# Patient Record
Sex: MALE | Race: WHITE | NOT HISPANIC OR LATINO | Employment: FULL TIME | URBAN - METROPOLITAN AREA
[De-identification: names, ages, dates, MRNs, and addresses within clinical notes are randomized per-mention and may not be internally consistent; named-entity substitution may affect disease eponyms.]

---

## 2023-08-22 ENCOUNTER — TRANSFERRED RECORDS (OUTPATIENT)
Dept: HEALTH INFORMATION MANAGEMENT | Facility: CLINIC | Age: 58
End: 2023-08-22

## 2023-08-22 LAB
ALT SERPL-CCNC: 23 U/L
AST SERPL-CCNC: 26 U/L (ref 17–59)
CHOLESTEROL (EXTERNAL): 219 MG/DL (ref 0–200)
CREATININE (EXTERNAL): 0.9 MG/DL (ref 0.7–1.4)
GFR ESTIMATED (EXTERNAL): >60 ML/MIN/1.73M2
GLUCOSE (EXTERNAL): 88 MG/DL (ref 60–99)
HBA1C MFR BLD: 5.39 % (ref 4–5.6)
HDLC SERPL-MCNC: 44 MG/DL (ref 35–65)
LDL CHOLESTEROL CALCULATED (EXTERNAL): 135 MG/DL (ref 0–130)
POTASSIUM (EXTERNAL): 4.4 MMOL/L (ref 3.5–5.1)
TRIGLYCERIDES (EXTERNAL): 199 MG/DL (ref 0–200)
TSH SERPL-ACNC: 1.98 MIU/L (ref 0.47–4.68)

## 2023-09-12 ENCOUNTER — HOSPITAL ENCOUNTER (OUTPATIENT)
Dept: CARDIOLOGY | Facility: CLINIC | Age: 58
Discharge: HOME | End: 2023-09-12
Attending: FAMILY MEDICINE | Admitting: FAMILY MEDICINE
Payer: COMMERCIAL

## 2023-09-12 DIAGNOSIS — R01.1 HEART MURMUR: ICD-10-CM

## 2023-09-12 LAB — BI-PLANE LVEF ECHO: NORMAL

## 2023-09-12 PROCEDURE — 93306 TTE W/DOPPLER COMPLETE: CPT | Mod: 26 | Performed by: GENERAL ACUTE CARE HOSPITAL

## 2023-09-12 PROCEDURE — 93306 TTE W/DOPPLER COMPLETE: CPT

## 2023-09-21 ENCOUNTER — MEDICAL CORRESPONDENCE (OUTPATIENT)
Dept: HEALTH INFORMATION MANAGEMENT | Facility: CLINIC | Age: 58
End: 2023-09-21
Payer: COMMERCIAL

## 2023-09-25 ENCOUNTER — TRANSFERRED RECORDS (OUTPATIENT)
Dept: HEALTH INFORMATION MANAGEMENT | Facility: CLINIC | Age: 58
End: 2023-09-25

## 2023-09-27 ENCOUNTER — OFFICE VISIT (OUTPATIENT)
Dept: CARDIOLOGY | Facility: CLINIC | Age: 58
End: 2023-09-27
Payer: COMMERCIAL

## 2023-09-27 VITALS
HEART RATE: 68 BPM | WEIGHT: 211 LBS | RESPIRATION RATE: 16 BRPM | HEIGHT: 69 IN | BODY MASS INDEX: 31.25 KG/M2 | DIASTOLIC BLOOD PRESSURE: 78 MMHG | SYSTOLIC BLOOD PRESSURE: 142 MMHG

## 2023-09-27 DIAGNOSIS — I50.30 HEART FAILURE WITH PRESERVED EJECTION FRACTION, NYHA CLASS I (H): ICD-10-CM

## 2023-09-27 DIAGNOSIS — I10 ESSENTIAL HYPERTENSION: ICD-10-CM

## 2023-09-27 DIAGNOSIS — I77.810 ASCENDING AORTA DILATATION (H): ICD-10-CM

## 2023-09-27 DIAGNOSIS — I35.1 NONRHEUMATIC AORTIC VALVE INSUFFICIENCY: Primary | ICD-10-CM

## 2023-09-27 PROCEDURE — 99204 OFFICE O/P NEW MOD 45 MIN: CPT | Performed by: INTERNAL MEDICINE

## 2023-09-27 RX ORDER — PANTOPRAZOLE SODIUM 40 MG/1
40 TABLET, DELAYED RELEASE ORAL DAILY
COMMUNITY
Start: 2023-03-01

## 2023-09-27 RX ORDER — DIPHENHYDRAMINE HCL 25 MG
25 TABLET ORAL AT BEDTIME
COMMUNITY

## 2023-09-27 RX ORDER — MULTIVITAMIN,THERAPEUTIC
1 TABLET ORAL DAILY
COMMUNITY

## 2023-09-27 RX ORDER — LOSARTAN POTASSIUM 25 MG/1
25 TABLET ORAL DAILY
Qty: 30 TABLET | Refills: 11 | Status: SHIPPED | OUTPATIENT
Start: 2023-09-27 | End: 2024-01-03

## 2023-09-27 RX ORDER — CALCIUM CARBONATE/VITAMIN D3 500-10/5ML
400 LIQUID (ML) ORAL DAILY
COMMUNITY

## 2023-09-27 RX ORDER — HYDROCHLOROTHIAZIDE 12.5 MG/1
12.5 CAPSULE ORAL DAILY
COMMUNITY
Start: 2023-08-22

## 2023-09-27 NOTE — LETTER
9/27/2023    Suzanne Castellanos MD  [facility] [address] [place] [id number]    RE: Dejon Rai       Dear Colleague,     I had the pleasure of seeing Dejon Ria in the [facility].    [facility]   [address]  [place] [address]   [email or web address]   OFFICE: [phone]     CARDIOLOGY CLINIC NOTE     Thank you, Dr. Castellanos, Suzanne MATA, for asking the [facility] team to see Mr. Dejon Rai to evaluate Consult (Consult on Aortic regurgitation labs done 8/22, EKG 8/22 and echo 8/24)         Assessment/Recommendations   Assessment:    Moderate aortic valve regurgitation - with some symptoms of HFpEF, now compensated.   Dilated ascending aorta- not yet aneurysmal, but would aggressively treat blood pressure to reduce progression.  Essential Hypertension - remains elevated on hydrochlorothiazide.    Plan:  Start losartan  Advised regular exercise  Monitor home BP. Pt to call in 2-3 weeks with readings for further medication titration. Plan to titrate up losartan and transition hydrochlorothiazide to carvedilol or metoprolol. Goal blood pressure <120/80 mmHg.  Follow up with me in clinic in 3 months.         History of Present Illness   Mr. Dejon Rai is a 57 year old male who presents for evaluation of an abnormal echocardiogram.    Mr. Rai recently presented to his primary care physician with symptoms of weight gain, fluid retention, and dyspnea on exertion.  He was started on hydrochlorothiazide with improvement in his symptoms.  He was evaluated with an echocardiogram that revealed moderate aortic valve regurgitation and mild to moderately dilated ascending aorta.  He is currently able to exercise more regularly without exertional dyspnea.  Despite taking hydrochlorothiazide his blood pressure remains elevated.      Other than noted above, Mr. Rai denies any chest pain/pressure/tightness,  "shortness of breath at rest or with exertion, light headedness/dizziness, pre-syncope, syncope, lower extremity swelling, palpitations, paroxysmal nocturnal dyspnea (PND), or orthopnea.     Cardiac Problems and Cardiac Diagnostics     Most Recent Cardiac testing:  ECG dated 8/22/23 (personaly reviewed and interpreted): sinus rhythm with mild IVCD.    ECHO (report reviewed):   TTE 9/12/23  1. Left ventricular chamber size, wall thickness and systolic function are normal. The calculated left ventricular ejection fraction is 61%.  2. Right ventricular chamber size and systolic function are normal.  3. Sclerotic trileaflet aortic valve with moderate centrally-directed aortic regurgitation. No hemodynamically significant aortic stenosis.  4. Non-aneurysmal dilatation of the ascending aorta measuring 4.3 cm.  5. No prior study available for comparison.         Medications  Allergies   Current Outpatient Medications   Medication Sig Dispense Refill    diphenhydrAMINE (BENADRYL) 25 MG tablet Take 25 mg by mouth At Bedtime      hydrochlorothiazide (MICROZIDE) 12.5 MG capsule Take 12.5 mg by mouth daily      magnesium oxide 400 MG CAPS Take 400 mg by mouth daily      multivitamin, therapeutic (THERA-VIT) TABS tablet Take 1 tablet by mouth daily      pantoprazole (PROTONIX) 40 MG EC tablet Take 40 mg by mouth daily      losartan (COZAAR) 25 MG tablet Take 1 tablet (25 mg) by mouth daily 30 tablet 11      Allergies   Allergen Reactions    Morphine And Related Unknown        Physical Examination Review of Systems   Vitals: BP (!) 142/78 (BP Location: Left arm, Patient Position: Sitting, Cuff Size: Adult Regular)   Pulse 68   Resp 16   Ht 1.753 m (5' 9\")   Wt 95.7 kg (211 lb)   BMI 31.16 kg/m    BMI= Body mass index is 31.16 kg/m .  Wt Readings from Last 3 Encounters:   09/27/23 95.7 kg (211 lb)       General: pleasant male. No acute distress.   HENT: external ears normal. Nares patent. Mucous membranes moist.  Eyes: perrla, " extraocular muscles intact. No scleral icterus.   Neck: No JVD  Lungs: clear to auscultation  COR:  regular rate and rhythm, 2/6 diastolic murmur at LLSB  Abd: nondistended, BS present  Extrem: No edema        Please refer above for cardiac ROS details.       Past History   Past Medical History: History reviewed. No pertinent past medical history.     Past Surgical History: History reviewed. No pertinent surgical history.     Family History: History reviewed. No pertinent family history. Father's medical history is unknown.    Social History:   Social History     Socioeconomic History    Marital status:      Spouse name: Not on file    Number of children: Not on file    Years of education: Not on file    Highest education level: Not on file   Occupational History    Not on file   Tobacco Use    Smoking status: Former     Types: Cigarettes     Start date:      Quit date:      Years since quittin.7    Smokeless tobacco: Never   Vaping Use    Vaping Use: Never used   Substance and Sexual Activity    Alcohol use: Not on file    Drug use: Never    Sexual activity: Not on file   Other Topics Concern    Not on file   Social History Narrative    Not on file     Social Determinants of Health     Financial Resource Strain: Not on file   Food Insecurity: Not on file   Transportation Needs: Not on file   Physical Activity: Not on file   Stress: Not on file   Social Connections: Not on file   Interpersonal Safety: Not on file   Housing Stability: Not on file            Lab Results    Chemistry/lipid CBC Cardiac Enzymes/BNP/TSH/INR   No results found for: CHOL, HDL, TRIG, CHOLHDL, CREATININE, BUN, NA, CO2 No results found for: WBC, HGB, HCT, MCV, PLT No results found for: CKTOTAL, CKMB, TROPONINI, BNP, TSH, INR             Thank you for allowing me to participate in the care of your patient.      Sincerely,     Sumit Leach MD     [facility]  cc:   No  referring provider defined for this encounter.

## 2023-09-27 NOTE — PROGRESS NOTES
[facility]   [address]  [place] [address]   [email or web address]   OFFICE: [phone]     CARDIOLOGY CLINIC NOTE     Thank you, Suzanne Early, for asking the [facility] Heart Care team to see Mr. Dejon Rai to evaluate Consult (Consult on Aortic regurgitation labs done 8/22, EKG 8/22 and echo 8/24)         Assessment/Recommendations   Assessment:    Moderate aortic valve regurgitation - with some symptoms of HFpEF, now compensated.   Dilated ascending aorta- not yet aneurysmal, but would aggressively treat blood pressure to reduce progression.  Essential Hypertension - remains elevated on hydrochlorothiazide.    Plan:  Start losartan  Advised regular exercise  Monitor home BP. Pt to call in 2-3 weeks with readings for further medication titration. Plan to titrate up losartan and transition hydrochlorothiazide to carvedilol or metoprolol. Goal blood pressure <120/80 mmHg.  Follow up with me in clinic in 3 months.         History of Present Illness   Mr. Dejon Rai is a 57 year old male who presents for evaluation of an abnormal echocardiogram.    Mr. Rai recently presented to his primary care physician with symptoms of weight gain, fluid retention, and dyspnea on exertion.  He was started on hydrochlorothiazide with improvement in his symptoms.  He was evaluated with an echocardiogram that revealed moderate aortic valve regurgitation and mild to moderately dilated ascending aorta.  He is currently able to exercise more regularly without exertional dyspnea.  Despite taking hydrochlorothiazide his blood pressure remains elevated.      Other than noted above, Mr. Rai denies any chest pain/pressure/tightness, shortness of breath at rest or with exertion, light headedness/dizziness, pre-syncope, syncope, lower extremity swelling, palpitations, paroxysmal nocturnal dyspnea (PND), or orthopnea.     Cardiac Problems and Cardiac Diagnostics  "    Most Recent Cardiac testing:  ECG dated 8/22/23 (personaly reviewed and interpreted): sinus rhythm with mild IVCD.    ECHO (report reviewed):   TTE 9/12/23  1. Left ventricular chamber size, wall thickness and systolic function are normal. The calculated left ventricular ejection fraction is 61%.  2. Right ventricular chamber size and systolic function are normal.  3. Sclerotic trileaflet aortic valve with moderate centrally-directed aortic regurgitation. No hemodynamically significant aortic stenosis.  4. Non-aneurysmal dilatation of the ascending aorta measuring 4.3 cm.  5. No prior study available for comparison.         Medications  Allergies   Current Outpatient Medications   Medication Sig Dispense Refill    diphenhydrAMINE (BENADRYL) 25 MG tablet Take 25 mg by mouth At Bedtime      hydrochlorothiazide (MICROZIDE) 12.5 MG capsule Take 12.5 mg by mouth daily      magnesium oxide 400 MG CAPS Take 400 mg by mouth daily      multivitamin, therapeutic (THERA-VIT) TABS tablet Take 1 tablet by mouth daily      pantoprazole (PROTONIX) 40 MG EC tablet Take 40 mg by mouth daily      losartan (COZAAR) 25 MG tablet Take 1 tablet (25 mg) by mouth daily 30 tablet 11      Allergies   Allergen Reactions    Morphine And Related Unknown        Physical Examination Review of Systems   Vitals: BP (!) 142/78 (BP Location: Left arm, Patient Position: Sitting, Cuff Size: Adult Regular)   Pulse 68   Resp 16   Ht 1.753 m (5' 9\")   Wt 95.7 kg (211 lb)   BMI 31.16 kg/m    BMI= Body mass index is 31.16 kg/m .  Wt Readings from Last 3 Encounters:   09/27/23 95.7 kg (211 lb)       General: pleasant male. No acute distress.   HENT: external ears normal. Nares patent. Mucous membranes moist.  Eyes: perrla, extraocular muscles intact. No scleral icterus.   Neck: No JVD  Lungs: clear to auscultation  COR:  regular rate and rhythm, 2/6 diastolic murmur at LLSB  Abd: nondistended, BS present  Extrem: No edema        Please refer above " for cardiac ROS details.       Past History   Past Medical History: History reviewed. No pertinent past medical history.     Past Surgical History: History reviewed. No pertinent surgical history.     Family History: History reviewed. No pertinent family history. Father's medical history is unknown.    Social History:   Social History     Socioeconomic History    Marital status:      Spouse name: Not on file    Number of children: Not on file    Years of education: Not on file    Highest education level: Not on file   Occupational History    Not on file   Tobacco Use    Smoking status: Former     Types: Cigarettes     Start date:      Quit date:      Years since quittin.7    Smokeless tobacco: Never   Vaping Use    Vaping Use: Never used   Substance and Sexual Activity    Alcohol use: Not on file    Drug use: Never    Sexual activity: Not on file   Other Topics Concern    Not on file   Social History Narrative    Not on file     Social Determinants of Health     Financial Resource Strain: Not on file   Food Insecurity: Not on file   Transportation Needs: Not on file   Physical Activity: Not on file   Stress: Not on file   Social Connections: Not on file   Interpersonal Safety: Not on file   Housing Stability: Not on file            Lab Results    Chemistry/lipid CBC Cardiac Enzymes/BNP/TSH/INR   No results found for: CHOL, HDL, TRIG, CHOLHDL, CREATININE, BUN, NA, CO2 No results found for: WBC, HGB, HCT, MCV, PLT No results found for: CKTOTAL, CKMB, TROPONINI, BNP, TSH, INR

## 2023-09-27 NOTE — PATIENT INSTRUCTIONS
It was a pleasure to meet with you today.      Below is a summary of your visit.   Start taking losartan 25 mg daily for your blood pressure.  Also continue your other medications without changes.  Omron is a brand of blood pressure cuffs that is generally quite reliable.  I encourage regular exercise, but avoid very high weights or power lifting.   Send me blood pressure readings in 2-3 weeks to make additional adjustments to your medications  Follow up with me in 3 months to check in.    Blood Pressure Monitoring:   Monitor blood pressure and heart rate a few times a week  -Sit for 5 minutes before taking blood pressure  -Have arm at heart level when taking blood pressure  -Have arm supported when taking blood pressure  -Record both heart rate and blood pressure  -Bring recordings to clinic visits       Please do not hesitate to call the [facility] with any questions or concerns at [phone].     Sincerely,

## 2023-10-22 ENCOUNTER — HEALTH MAINTENANCE LETTER (OUTPATIENT)
Age: 58
End: 2023-10-22

## 2024-01-03 ENCOUNTER — OFFICE VISIT (OUTPATIENT)
Dept: CARDIOLOGY | Facility: CLINIC | Age: 59
End: 2024-01-03
Attending: INTERNAL MEDICINE
Payer: COMMERCIAL

## 2024-01-03 VITALS
HEART RATE: 84 BPM | RESPIRATION RATE: 16 BRPM | HEIGHT: 69 IN | SYSTOLIC BLOOD PRESSURE: 134 MMHG | WEIGHT: 213 LBS | BODY MASS INDEX: 31.55 KG/M2 | DIASTOLIC BLOOD PRESSURE: 74 MMHG

## 2024-01-03 DIAGNOSIS — I35.1 NONRHEUMATIC AORTIC VALVE INSUFFICIENCY: ICD-10-CM

## 2024-01-03 DIAGNOSIS — I77.810 ASCENDING AORTA DILATATION (H): ICD-10-CM

## 2024-01-03 DIAGNOSIS — I10 ESSENTIAL HYPERTENSION: ICD-10-CM

## 2024-01-03 PROCEDURE — 99214 OFFICE O/P EST MOD 30 MIN: CPT | Performed by: INTERNAL MEDICINE

## 2024-01-03 RX ORDER — LOSARTAN POTASSIUM 50 MG/1
50 TABLET ORAL DAILY
Qty: 90 TABLET | Refills: 3 | Status: SHIPPED | OUTPATIENT
Start: 2024-01-03

## 2024-01-03 NOTE — LETTER
1/3/2024    Suzanne Castellanos MD  [facility] [address] [place] [id number]    RE: Dejon Rai       Dear Colleague,     I had the pleasure of seeing Dejon Rai in the [facility].    [facility] CARE   [address]  [place] [address]   [email or web address]   OFFICE: [phone]     CARDIOLOGY CLINIC NOTE     Thank you, Dr. Castellanos, Suzanne MATA, for asking the [facility] Care team to see Mr. Dejon Rai to evaluate Follow Up (Follow up 9/27/23)         Assessment/Recommendations   Assessment:    Moderate aortic valve regurgitation - with some symptoms of HFpEF, now compensated.   Dilated ascending aorta- not yet aneurysmal, but would aggressively treat blood pressure to reduce progression.  Essential Hypertension - borderline controlled.    Plan:  Increase losartan to 50 mg daily. Should check a BMP in 1-2 weeks to check renal function and potassium with dose adjustment.  Advised regular exercise  Follow up in 6-8 months with and echo prior.          History of Present Illness   Mr. Dejon Rai is a 58 year old male who presents for evaluation of an abnormal echocardiogram.    Mr. Rai has moderate aortic valve regurgitation and initially presented with some mild heart failure symptoms. He was started on hydrochlorothiazide with improvement in his symptoms.      Dejon reports no new cardiac symptoms. He gained some weight over the holidays but doesn't think it is water weight. He tried exercising but developed knee pain. Tolerating losartan.    Other than noted above, Mr. Rai denies any chest pain/pressure/tightness, shortness of breath at rest or with exertion, light headedness/dizziness, pre-syncope, syncope, lower extremity swelling, palpitations, paroxysmal nocturnal dyspnea (PND), or orthopnea.     Cardiac Problems and Cardiac Diagnostics     Most Recent Cardiac testing:  ECG dated 8/22/23 (personaly  "reviewed and interpreted): sinus rhythm with mild IVCD.    ECHO (report reviewed):   TTE 9/12/23  1. Left ventricular chamber size, wall thickness and systolic function are normal. The calculated left ventricular ejection fraction is 61%.  2. Right ventricular chamber size and systolic function are normal.  3. Sclerotic trileaflet aortic valve with moderate centrally-directed aortic regurgitation. No hemodynamically significant aortic stenosis.  4. Non-aneurysmal dilatation of the ascending aorta measuring 4.3 cm.  5. No prior study available for comparison.         Medications  Allergies   Current Outpatient Medications   Medication Sig Dispense Refill    diphenhydrAMINE (BENADRYL) 25 MG tablet Take 25 mg by mouth At Bedtime      hydrochlorothiazide (MICROZIDE) 12.5 MG capsule Take 12.5 mg by mouth daily      losartan (COZAAR) 50 MG tablet Take 1 tablet (50 mg) by mouth daily 90 tablet 3    magnesium oxide 400 MG CAPS Take 400 mg by mouth daily      multivitamin, therapeutic (THERA-VIT) TABS tablet Take 1 tablet by mouth daily      pantoprazole (PROTONIX) 40 MG EC tablet Take 40 mg by mouth daily        Allergies   Allergen Reactions    Morphine And Related Unknown        Physical Examination Review of Systems   Vitals: /74 (BP Location: Left arm, Patient Position: Sitting, Cuff Size: Adult Large)   Pulse 84   Resp 16   Ht 1.753 m (5' 9\")   Wt 96.6 kg (213 lb)   BMI 31.45 kg/m    BMI= Body mass index is 31.45 kg/m .  Wt Readings from Last 3 Encounters:   01/03/24 96.6 kg (213 lb)   09/27/23 95.7 kg (211 lb)       General: pleasant male. No acute distress.   HENT: external ears normal. Nares patent. Mucous membranes moist.  Eyes: perrla, extraocular muscles intact. No scleral icterus.   Neck: No JVD  Lungs: clear to auscultation  COR: regular rate and rhythm, 2/6 diastolic murmur at LLSB  Abd: nondistended, BS present  Extrem: No edema        Please refer above for cardiac ROS details.       Past History " "  Past Medical History: History reviewed. No pertinent past medical history.     Past Surgical History: History reviewed. No pertinent surgical history.     Family History: History reviewed. No pertinent family history. Father's medical history is unknown.    Social History:   Social History     Socioeconomic History    Marital status:      Spouse name: Not on file    Number of children: Not on file    Years of education: Not on file    Highest education level: Not on file   Occupational History    Not on file   Tobacco Use    Smoking status: Former     Types: Cigarettes     Start date:      Quit date:      Years since quittin.0    Smokeless tobacco: Never   Vaping Use    Vaping Use: Never used   Substance and Sexual Activity    Alcohol use: Not on file    Drug use: Never    Sexual activity: Not on file   Other Topics Concern    Not on file   Social History Narrative    Not on file     Social Determinants of Health     Financial Resource Strain: Not on file   Food Insecurity: Not on file   Transportation Needs: Not on file   Physical Activity: Not on file   Stress: Not on file   Social Connections: Not on file   Interpersonal Safety: Not on file   Housing Stability: Not on file            Lab Results    Chemistry/lipid CBC Cardiac Enzymes/BNP/TSH/INR   No results found for: \"CHOL\", \"HDL\", \"TRIG\", \"CHOLHDL\", \"CREATININE\", \"BUN\", \"NA\", \"CO2\" No results found for: \"WBC\", \"HGB\", \"HCT\", \"MCV\", \"PLT\" No results found for: \"CKTOTAL\", \"CKMB\", \"TROPONINI\", \"BNP\", \"TSH\", \"INR\"             Thank you for allowing me to participate in the care of your patient.      Sincerely,     Sumit Leach MD     [facility]  cc:   Sumit Leach MD  [address]  [place] [address]      "

## 2024-01-03 NOTE — PROGRESS NOTES
[facility] HEART CARE   [address]  [place] [address]   [email or web address]   OFFICE: [phone]     CARDIOLOGY CLINIC NOTE     Thank you, Suzanne Early, for asking the [facility] Heart Care team to see Mr. Dejon Rai to evaluate Follow Up (Follow up 9/27/23)         Assessment/Recommendations   Assessment:    Moderate aortic valve regurgitation - with some symptoms of HFpEF, now compensated.   Dilated ascending aorta- not yet aneurysmal, but would aggressively treat blood pressure to reduce progression.  Essential Hypertension - borderline controlled.    Plan:  Increase losartan to 50 mg daily. Should check a BMP in 1-2 weeks to check renal function and potassium with dose adjustment.  Advised regular exercise  Follow up in 6-8 months with and echo prior.          History of Present Illness   Mr. Dejon Rai is a 58 year old male who presents for evaluation of an abnormal echocardiogram.    Mr. Rai has moderate aortic valve regurgitation and initially presented with some mild heart failure symptoms. He was started on hydrochlorothiazide with improvement in his symptoms.      Dejon reports no new cardiac symptoms. He gained some weight over the holidays but doesn't think it is water weight. He tried exercising but developed knee pain. Tolerating losartan.    Other than noted above, Mr. Rai denies any chest pain/pressure/tightness, shortness of breath at rest or with exertion, light headedness/dizziness, pre-syncope, syncope, lower extremity swelling, palpitations, paroxysmal nocturnal dyspnea (PND), or orthopnea.     Cardiac Problems and Cardiac Diagnostics     Most Recent Cardiac testing:  ECG dated 8/22/23 (personaly reviewed and interpreted): sinus rhythm with mild IVCD.    ECHO (report reviewed):   TTE 9/12/23  1. Left ventricular chamber size, wall thickness and systolic function are normal. The calculated left ventricular ejection  "fraction is 61%.  2. Right ventricular chamber size and systolic function are normal.  3. Sclerotic trileaflet aortic valve with moderate centrally-directed aortic regurgitation. No hemodynamically significant aortic stenosis.  4. Non-aneurysmal dilatation of the ascending aorta measuring 4.3 cm.  5. No prior study available for comparison.         Medications  Allergies   Current Outpatient Medications   Medication Sig Dispense Refill    diphenhydrAMINE (BENADRYL) 25 MG tablet Take 25 mg by mouth At Bedtime      hydrochlorothiazide (MICROZIDE) 12.5 MG capsule Take 12.5 mg by mouth daily      losartan (COZAAR) 50 MG tablet Take 1 tablet (50 mg) by mouth daily 90 tablet 3    magnesium oxide 400 MG CAPS Take 400 mg by mouth daily      multivitamin, therapeutic (THERA-VIT) TABS tablet Take 1 tablet by mouth daily      pantoprazole (PROTONIX) 40 MG EC tablet Take 40 mg by mouth daily        Allergies   Allergen Reactions    Morphine And Related Unknown        Physical Examination Review of Systems   Vitals: /74 (BP Location: Left arm, Patient Position: Sitting, Cuff Size: Adult Large)   Pulse 84   Resp 16   Ht 1.753 m (5' 9\")   Wt 96.6 kg (213 lb)   BMI 31.45 kg/m    BMI= Body mass index is 31.45 kg/m .  Wt Readings from Last 3 Encounters:   01/03/24 96.6 kg (213 lb)   09/27/23 95.7 kg (211 lb)       General: pleasant male. No acute distress.   HENT: external ears normal. Nares patent. Mucous membranes moist.  Eyes: perrla, extraocular muscles intact. No scleral icterus.   Neck: No JVD  Lungs: clear to auscultation  COR: regular rate and rhythm, 2/6 diastolic murmur at LLSB  Abd: nondistended, BS present  Extrem: No edema        Please refer above for cardiac ROS details.       Past History   Past Medical History: History reviewed. No pertinent past medical history.     Past Surgical History: History reviewed. No pertinent surgical history.     Family History: History reviewed. No pertinent family history. " "Father's medical history is unknown.    Social History:   Social History     Socioeconomic History    Marital status:      Spouse name: Not on file    Number of children: Not on file    Years of education: Not on file    Highest education level: Not on file   Occupational History    Not on file   Tobacco Use    Smoking status: Former     Types: Cigarettes     Start date:      Quit date:      Years since quittin.0    Smokeless tobacco: Never   Vaping Use    Vaping Use: Never used   Substance and Sexual Activity    Alcohol use: Not on file    Drug use: Never    Sexual activity: Not on file   Other Topics Concern    Not on file   Social History Narrative    Not on file     Social Determinants of Health     Financial Resource Strain: Not on file   Food Insecurity: Not on file   Transportation Needs: Not on file   Physical Activity: Not on file   Stress: Not on file   Social Connections: Not on file   Interpersonal Safety: Not on file   Housing Stability: Not on file            Lab Results    Chemistry/lipid CBC Cardiac Enzymes/BNP/TSH/INR   No results found for: \"CHOL\", \"HDL\", \"TRIG\", \"CHOLHDL\", \"CREATININE\", \"BUN\", \"NA\", \"CO2\" No results found for: \"WBC\", \"HGB\", \"HCT\", \"MCV\", \"PLT\" No results found for: \"CKTOTAL\", \"CKMB\", \"TROPONINI\", \"BNP\", \"TSH\", \"INR\"         "

## 2024-01-03 NOTE — PATIENT INSTRUCTIONS
It was a pleasure to meet with you today.      Below is a summary of your visit.   Increase your losartan to 50 mg daily. I sent an updated prescription to [facility] pharmacy. You can use the supply you have by taking two tablets per day. We should check some blood work for kidney function and potassium in 1-2 weeks with the medication dose change.  Continue your other medications without changes.  I still encourage regular exercise  Follow up with me in August with an echo prior    We will call you to inform you of your test or procedure results within 3 business days of the test being performed.  If you do not hear from our office with the test results within 1 week please do not hesitate to call asking for these results.     Please do not hesitate to call the [facility] with any questions or concerns at [phone]. You can also reach my nurse, Katherin, at [phone].    Sincerely,

## 2024-12-15 ENCOUNTER — HEALTH MAINTENANCE LETTER (OUTPATIENT)
Age: 59
End: 2024-12-15

## 2025-03-28 ENCOUNTER — MEDICAL CORRESPONDENCE (OUTPATIENT)
Dept: HEALTH INFORMATION MANAGEMENT | Facility: CLINIC | Age: 60
End: 2025-03-28
Payer: COMMERCIAL

## 2025-03-28 ENCOUNTER — TRANSFERRED RECORDS (OUTPATIENT)
Dept: HEALTH INFORMATION MANAGEMENT | Facility: CLINIC | Age: 60
End: 2025-03-28
Payer: COMMERCIAL

## 2025-03-31 ENCOUNTER — ANCILLARY ORDERS (OUTPATIENT)
Dept: CARDIOLOGY | Facility: CLINIC | Age: 60
End: 2025-03-31
Payer: COMMERCIAL

## 2025-03-31 DIAGNOSIS — I35.1 MODERATE AORTIC VALVE REGURGITATION: ICD-10-CM

## 2025-03-31 DIAGNOSIS — I77.810 AORTIC ROOT DILATION: Primary | ICD-10-CM

## 2025-04-29 ENCOUNTER — ANCILLARY PROCEDURE (OUTPATIENT)
Dept: CARDIOLOGY | Facility: CLINIC | Age: 60
End: 2025-04-29
Attending: FAMILY MEDICINE
Payer: COMMERCIAL

## 2025-04-29 DIAGNOSIS — I77.810 AORTIC ROOT DILATION: ICD-10-CM

## 2025-04-29 DIAGNOSIS — I35.1 MODERATE AORTIC VALVE REGURGITATION: ICD-10-CM

## 2025-04-29 LAB — LVEF ECHO: NORMAL

## 2025-04-29 PROCEDURE — 93306 TTE W/DOPPLER COMPLETE: CPT | Performed by: STUDENT IN AN ORGANIZED HEALTH CARE EDUCATION/TRAINING PROGRAM

## 2025-06-11 ENCOUNTER — TRANSFERRED RECORDS (OUTPATIENT)
Dept: HEALTH INFORMATION MANAGEMENT | Facility: CLINIC | Age: 60
End: 2025-06-11

## 2025-07-01 ENCOUNTER — TRANSFERRED RECORDS (OUTPATIENT)
Dept: HEALTH INFORMATION MANAGEMENT | Facility: CLINIC | Age: 60
End: 2025-07-01
Payer: COMMERCIAL

## 2025-08-06 ENCOUNTER — TELEPHONE (OUTPATIENT)
Dept: CARDIOLOGY | Facility: CLINIC | Age: 60
End: 2025-08-06
Payer: COMMERCIAL

## 2025-08-06 DIAGNOSIS — I77.810 ASCENDING AORTA DILATATION: ICD-10-CM

## 2025-08-06 DIAGNOSIS — I10 ESSENTIAL HYPERTENSION: ICD-10-CM

## 2025-08-07 RX ORDER — LOSARTAN POTASSIUM 100 MG/1
100 TABLET ORAL EVERY EVENING
Qty: 90 TABLET | Refills: 2 | Status: SHIPPED | OUTPATIENT
Start: 2025-08-07

## 2025-08-21 ENCOUNTER — MYC MEDICAL ADVICE (OUTPATIENT)
Dept: CARDIOLOGY | Facility: CLINIC | Age: 60
End: 2025-08-21
Payer: COMMERCIAL

## 2025-08-21 DIAGNOSIS — I10 ESSENTIAL HYPERTENSION: ICD-10-CM

## 2025-08-21 DIAGNOSIS — I77.810 ASCENDING AORTA DILATATION: ICD-10-CM

## 2025-08-25 RX ORDER — LOSARTAN POTASSIUM 50 MG/1
50 TABLET ORAL EVERY EVENING
Qty: 90 TABLET | Refills: 1 | Status: SHIPPED | OUTPATIENT
Start: 2025-08-25

## 2025-08-25 RX ORDER — AMLODIPINE BESYLATE 2.5 MG/1
2.5 TABLET ORAL DAILY
Qty: 30 TABLET | Refills: 2 | Status: SHIPPED | OUTPATIENT
Start: 2025-08-25